# Patient Record
Sex: MALE | Race: WHITE | Employment: FULL TIME | URBAN - METROPOLITAN AREA
[De-identification: names, ages, dates, MRNs, and addresses within clinical notes are randomized per-mention and may not be internally consistent; named-entity substitution may affect disease eponyms.]

---

## 2020-10-29 ENCOUNTER — HOSPITAL ENCOUNTER (EMERGENCY)
Age: 20
Discharge: HOME | End: 2020-10-29
Attending: EMERGENCY MEDICINE
Payer: COMMERCIAL

## 2020-10-29 VITALS
SYSTOLIC BLOOD PRESSURE: 135 MMHG | HEART RATE: 112 BPM | DIASTOLIC BLOOD PRESSURE: 83 MMHG | WEIGHT: 190 LBS | TEMPERATURE: 98.8 F | BODY MASS INDEX: 27.2 KG/M2 | RESPIRATION RATE: 18 BRPM | OXYGEN SATURATION: 98 % | HEIGHT: 70 IN

## 2020-10-29 DIAGNOSIS — B37.0 ORAL THRUSH: Primary | ICD-10-CM

## 2020-10-29 LAB — DEPRECATED S PYO AG THROAT QL EIA: NEGATIVE

## 2020-10-29 PROCEDURE — 87880 STREP A ASSAY W/OPTIC: CPT

## 2020-10-29 PROCEDURE — 99282 EMERGENCY DEPT VISIT SF MDM: CPT

## 2020-10-29 PROCEDURE — 87070 CULTURE OTHR SPECIMN AEROBIC: CPT

## 2020-10-29 RX ORDER — CLOTRIMAZOLE 10 MG/1
LOZENGE ORAL; TOPICAL
COMMUNITY
Start: 2020-10-28 | End: 2020-10-29

## 2020-10-29 RX ORDER — DESVENLAFAXINE 100 MG/1
TABLET, EXTENDED RELEASE ORAL
COMMUNITY
Start: 2020-10-04

## 2020-10-29 RX ORDER — DEXTROAMPHETAMINE SACCHARATE, AMPHETAMINE ASPARTATE, DEXTROAMPHETAMINE SULFATE AND AMPHETAMINE SULFATE 7.5; 7.5; 7.5; 7.5 MG/1; MG/1; MG/1; MG/1
TABLET ORAL
COMMUNITY
Start: 2020-09-26

## 2020-10-29 RX ORDER — MONTELUKAST SODIUM 10 MG/1
TABLET ORAL
COMMUNITY
Start: 2020-10-22

## 2020-10-29 RX ORDER — ALBUTEROL SULFATE 90 UG/1
AEROSOL, METERED RESPIRATORY (INHALATION)
COMMUNITY
Start: 2020-10-22

## 2020-10-29 RX ORDER — INHALER, ASSIST DEVICES
SPACER (EA) MISCELLANEOUS
COMMUNITY
Start: 2020-10-22

## 2020-10-29 RX ORDER — ACYCLOVIR 50 MG/G
OINTMENT TOPICAL
COMMUNITY
Start: 2020-10-22

## 2020-10-29 RX ORDER — NYSTATIN 100000 [USP'U]/ML
1 SUSPENSION ORAL 4 TIMES DAILY
Qty: 300 ML | Refills: 0 | Status: SHIPPED | OUTPATIENT
Start: 2020-10-29

## 2020-10-29 RX ORDER — LURASIDONE HYDROCHLORIDE 40 MG/1
TABLET, FILM COATED ORAL
COMMUNITY
Start: 2020-09-07

## 2020-10-29 NOTE — ED PROVIDER NOTES
EMERGENCY DEPARTMENT HISTORY AND PHYSICAL EXAM      Date: 10/29/2020  Patient Name: Víctor Phelan    History of Presenting Illness     Chief Complaint   Patient presents with    Sore Throat       History Provided By: Patient    HPI: Víctor Phelan, 23 y.o. male with a past medical history significant asthma presents to the ED with cc of tongue irritation secondary to oral thrush. Patient is noncannulated use of the Mycelex troches which were prescribed by [facility] yesterday for him. No other constitutional symptoms. He recently completed a course of oral steroids for his asthma. There are no other complaints, changes, or physical findings at this time. PCP: [facility], Pcp    No current facility-administered medications on file prior to encounter. Current Outpatient Medications on File Prior to Encounter   Medication Sig Dispense Refill    acyclovir (ZOVIRAX) 5 % ointment APPLY TO LEISON EVERY 3 HOURS (6 TIMES DAILY) FOR 7 DAYS,USING FINGERCOT      albuterol (PROVENTIL HFA, VENTOLIN HFA, PROAIR HFA) 90 mcg/actuation inhaler TAKE 2 PUFFS BY MOUTH EVERY 4 TO 6 HOURS AS NEEDED FOR BREATHING      dextroamphetamine-amphetamine (ADDERALL) 30 mg tablet TAKE 1 TABLET BY MOUTH TWICE A DAY*[facility] 9/6/20      Latuda 40 mg tab tablet TAKE 1 TABLET BY MOUTH EVERY DAY AFTER A MEAL AT LEAST 350 CALORIES      OptiChamber Violette [facility] USE AS DIRECTED      Desvenlafaxine 100 mg Tb24 TAKE 1 TABLET BY MOUTH EVERY DAY      montelukast (SINGULAIR) 10 mg tablet TAKE 1 TABLET BY MOUTH EVERY DAY      [DISCONTINUED] clotrimazole (MYCELEX) 10 mg chilango          Past History     Past Medical History:  Past Medical History:   Diagnosis Date    Asthma     Gastrointestinal disorder     gastric plycation       Past Surgical History:  History reviewed. No pertinent surgical history. Family History:  History reviewed. No pertinent family history.     Social History:  Social History     Tobacco Use    Smoking status: Current Every Day Smoker    Smokeless tobacco: Never Used   Substance Use Topics    Alcohol use: Never     Frequency: Never    Drug use: Never       Allergies:  No Known Allergies      Review of Systems     Review of Systems   Constitutional: Negative. HENT: Negative. As in HPI   Eyes: Negative. Respiratory: Negative. Cardiovascular: Negative. Gastrointestinal: Negative. Endocrine: Negative. Genitourinary: Negative. Musculoskeletal: Negative. Skin: Negative. Allergic/Immunologic: Negative. Neurological: Negative. Hematological: Negative. Psychiatric/Behavioral: Negative. All other systems reviewed and are negative. Physical Exam     Physical Exam  Vitals signs and nursing note reviewed. Constitutional:       General: He is not in acute distress. Appearance: He is well-developed. He is not toxic-appearing or diaphoretic. HENT:      Head: Normocephalic and atraumatic. Nose: Nose normal.      Comments: Oral thrush     Mouth/Throat:      Mouth: Mucous membranes are moist. Oral lesions present. Pharynx: Oropharynx is clear. Eyes:      Extraocular Movements: Extraocular movements intact. Pupils: Pupils are equal, round, and reactive to light. Neck:      Musculoskeletal: Normal range of motion and neck supple. Cardiovascular:      Rate and Rhythm: Normal rate and regular rhythm. Heart sounds: Normal heart sounds. Pulmonary:      Effort: Pulmonary effort is normal. No respiratory distress. Breath sounds: Normal breath sounds. Chest:      Chest wall: No mass or tenderness. Abdominal:      General: Bowel sounds are normal. There is no abdominal bruit. Palpations: Abdomen is soft. There is no hepatomegaly. Tenderness: There is no abdominal tenderness. There is no rebound. Musculoskeletal: Normal range of motion. Right lower leg: He exhibits no tenderness. No edema.       Left lower leg: He exhibits no tenderness. No edema. Skin:     General: Skin is warm and dry. Capillary Refill: Capillary refill takes less than 2 seconds. Neurological:      General: No focal deficit present. Mental Status: He is alert and oriented to person, place, and time. Psychiatric:         Mood and Affect: Mood normal.         Behavior: Behavior normal.         Lab and Diagnostic Study Results     Labs -     Recent Results (from the past 12 hour(s))   STREP AG SCREEN, GROUP A    Collection Time: 10/29/20  2:55 PM    Specimen: Throat   Result Value Ref Range    Group A Strep Ag ID Negative         Radiologic Studies -   CT Results  (Last 48 hours)    None        CXR Results  (Last 48 hours)    None            Medical Decision Making   I am the first provider for this patient. I reviewed the vital signs, available nursing notes, past medical history, past surgical history, family history and social history. Vital Signs-Reviewed the patient's vital signs. Patient Vitals for the past 12 hrs:   Temp Pulse Resp BP SpO2   10/29/20 1420 98.8 °F (37.1 °C) (!) 112 18 135/83 98 %       Records Reviewed: Nursing Notes    ED Course:   Initial assessment performed. The patients presenting problems have been discussed, and they are in agreement with the care plan formulated and outlined with them. I have encouraged them to ask questions as they arise throughout their visit. Provider Notes (Medical Decision Making): Uneventful ED course, clinical improvement with therapy, patient will be discharged to followup with PCP as directed    Disposition   Disposition: Home     Discharged    DISCHARGE PLAN:  1.    Current Discharge Medication List      CONTINUE these medications which have NOT CHANGED    Details   acyclovir (ZOVIRAX) 5 % ointment APPLY TO LEISON EVERY 3 HOURS (6 TIMES DAILY) FOR 7 DAYS,USING FINGERCOT      albuterol (PROVENTIL HFA, VENTOLIN HFA, PROAIR HFA) 90 mcg/actuation inhaler TAKE 2 PUFFS BY MOUTH EVERY 4 TO 6 HOURS AS NEEDED FOR BREATHING      clotrimazole (MYCELEX) 10 mg chilango       dextroamphetamine-amphetamine (ADDERALL) 30 mg tablet TAKE 1 TABLET BY MOUTH TWICE A DAY*[facility] 9/6/20      Latuda 40 mg tab tablet TAKE 1 TABLET BY MOUTH EVERY DAY AFTER A MEAL AT LEAST 350 CALORIES      [facility] USE AS DIRECTED      Desvenlafaxine 100 mg Tb24 TAKE 1 TABLET BY MOUTH EVERY DAY      montelukast (SINGULAIR) 10 mg tablet TAKE 1 TABLET BY MOUTH EVERY DAY           2. Follow-up Information     Follow up With Specialties Details Why Contact Info    [facility], Pcp  In 2 days  [address] [address]          3. Return to ED if worse   4. Discharge Medication List as of 10/29/2020  3:32 PM      START taking these medications    Details   nystatin (MYCOSTATIN) 100,000 unit/mL suspension Take 5 mL by mouth four (4) times daily. swish and spit, Normal, Disp-300 mL,R-0         CONTINUE these medications which have NOT CHANGED    Details   acyclovir (ZOVIRAX) 5 % ointment APPLY TO LEISON EVERY 3 HOURS (6 TIMES DAILY) FOR 7 DAYS,USING FINGERCOT, Historical Med      albuterol (PROVENTIL HFA, VENTOLIN HFA, PROAIR HFA) 90 mcg/actuation inhaler TAKE 2 PUFFS BY MOUTH EVERY 4 TO 6 HOURS AS NEEDED FOR BREATHING, Historical Med      dextroamphetamine-amphetamine (ADDERALL) 30 mg tablet TAKE 1 TABLET BY MOUTH TWICE A DAY*[facility] 9/6/20, Historical Med      Latuda 40 mg tab tablet TAKE 1 TABLET BY MOUTH EVERY DAY AFTER A MEAL AT LEAST 350 CALORIES, Historical Med, I[place]      OptiChamber Violette [facility] USE AS DIRECTED, Historical Med, [place]      Desvenlafaxine 100 mg Tb24 TAKE 1 TABLET BY MOUTH EVERY DAY, Historical Med      montelukast (SINGULAIR) 10 mg tablet TAKE 1 TABLET BY MOUTH EVERY DAY, Historical Med         STOP taking these medications       clotrimazole (MYCELEX) 10 mg chilango Comments:   Reason for Stopping:                 Diagnosis     Clinical Impression:   1.  Oral thrush        Attestations:    Ciera Pacheco MD [facility]    Please note that this dictation was completed with [employer], the computer voice recognition software. Quite often unanticipated grammatical, syntax, homophones, and other interpretive errors are inadvertently transcribed by the computer software. Please disregard these errors. Please excuse any errors that have escaped final proofreading. Thank you.

## 2020-10-29 NOTE — ED TRIAGE NOTES
Has been on oral steroids for several weeks and started about a week ago with pain and white rash; hurts to swallow/eat; thinks he has thrush

## 2020-10-30 LAB
BACTERIA SPEC CULT: NORMAL
SPECIAL REQUESTS,SREQ: NORMAL